# Patient Record
Sex: FEMALE | Race: BLACK OR AFRICAN AMERICAN | NOT HISPANIC OR LATINO | URBAN - NONMETROPOLITAN AREA
[De-identification: names, ages, dates, MRNs, and addresses within clinical notes are randomized per-mention and may not be internally consistent; named-entity substitution may affect disease eponyms.]

---

## 2022-12-07 ENCOUNTER — OFFICE (OUTPATIENT)
Dept: URBAN - NONMETROPOLITAN AREA CLINIC 5 | Facility: CLINIC | Age: 72
End: 2022-12-07

## 2022-12-07 VITALS
HEART RATE: 610 BPM | RESPIRATION RATE: 20 BRPM | DIASTOLIC BLOOD PRESSURE: 87 MMHG | SYSTOLIC BLOOD PRESSURE: 161 MMHG | WEIGHT: 252 LBS | HEIGHT: 62 IN

## 2022-12-07 DIAGNOSIS — K92.1 MELENA: ICD-10-CM

## 2022-12-07 DIAGNOSIS — K64.8 OTHER HEMORRHOIDS: ICD-10-CM

## 2022-12-07 PROCEDURE — 99204 OFFICE O/P NEW MOD 45 MIN: CPT | Performed by: INTERNAL MEDICINE

## 2022-12-07 NOTE — SERVICEHPINOTES
Naz Gama   is a   73 yo  female  with a past medical history of HLD, HTN, and seizure disorder who presents  to [facility] care for hematochezia.  Patient reports that she has been having bright blood per rectum for the last 3 weeks.  She does endorse similar episodes in the past which she was told was secondary to hemorrhoids.  She does endorse a "soreness" in her rectum and feels a bulging in the rectum.  She denies any melena, weight loss, abdominal pain, nausea, vomiting, dysphagia, or odynophagia.  She reports she typically moves her bowels daily.  She denies any hard stools, straining with bowel movements, or nocturnal bowel movements.  She denies smoking, EtOH use, or NSAID use.  She denies any family history of colorectal cancer.  She last had a colonoscopy in August 2020 with Dr. Paz where one 3 mm tubular adenoma was removed.

## 2022-12-07 NOTE — SERVICENOTES
Given patient's hematochezia suspected to be secondary to hemorrhoids I have counseled her on measures as above.  She reports the bleeding is slowly stopping.  If it persists she will let us know and topical steroids can be provided.  Will plan for a repeat colonoscopy given significant bleeding and history of colon polyps.

## 2023-01-03 ENCOUNTER — ON CAMPUS - OUTPATIENT (OUTPATIENT)
Dept: URBAN - NONMETROPOLITAN AREA HOSPITAL 28 | Facility: HOSPITAL | Age: 73
End: 2023-01-03

## 2023-01-03 DIAGNOSIS — K62.5 HEMORRHAGE OF ANUS AND RECTUM: ICD-10-CM

## 2023-01-03 DIAGNOSIS — K64.4 RESIDUAL HEMORRHOIDAL SKIN TAGS: ICD-10-CM

## 2023-01-03 DIAGNOSIS — Z86.010 PERSONAL HISTORY OF COLONIC POLYPS: ICD-10-CM

## 2023-01-03 DIAGNOSIS — D12.3 BENIGN NEOPLASM OF TRANSVERSE COLON: ICD-10-CM

## 2023-01-03 DIAGNOSIS — K64.8 OTHER HEMORRHOIDS: ICD-10-CM

## 2023-01-03 PROCEDURE — 45385 COLONOSCOPY W/LESION REMOVAL: CPT | Performed by: INTERNAL MEDICINE

## 2023-01-03 PROCEDURE — 45380 COLONOSCOPY AND BIOPSY: CPT | Mod: 59 | Performed by: INTERNAL MEDICINE

## 2023-06-19 ENCOUNTER — OFFICE (OUTPATIENT)
Dept: URBAN - NONMETROPOLITAN AREA CLINIC 5 | Facility: CLINIC | Age: 73
End: 2023-06-19

## 2023-06-19 VITALS
WEIGHT: 235 LBS | DIASTOLIC BLOOD PRESSURE: 87 MMHG | HEART RATE: 57 BPM | SYSTOLIC BLOOD PRESSURE: 151 MMHG | HEIGHT: 62 IN | RESPIRATION RATE: 18 BRPM

## 2023-06-19 DIAGNOSIS — K62.5 HEMORRHAGE OF ANUS AND RECTUM: ICD-10-CM

## 2023-06-19 DIAGNOSIS — K64.9 UNSPECIFIED HEMORRHOIDS: ICD-10-CM

## 2023-06-19 PROCEDURE — 99214 OFFICE O/P EST MOD 30 MIN: CPT | Performed by: INTERNAL MEDICINE

## 2023-06-19 NOTE — SERVICEHPINOTES
Naz Gama   is a   73   year old  female   with a past medical history of HLD, HTN, and seizure disorder who presents  tfor follow up of hematochezia.  Patient at initial visit in 12/2022 reported that she had been experiencing bright blood per rectum for the prior 3 weeks.  She did endorse similar episodes in the past which she was told were secondary to hemorrhoids.  She also reported a "soreness" in her rectum and felt a bulging in her rectum.  She denied any melena, weight loss, abdominal pain, nausea, vomiting, dysphagia, or odynophagia.  She reported she typically moved her bowels daily.  She denied any hard stools, straining with bowel movements, or nocturnal bowel movements.  She denied smoking, EtOH use, or NSAID use.  She denied any family history of colorectal cancer.  
br
[facility] were 7 colon polyps removed, small internal hemorrhoids were noted, and large external hemorrhoids thought to be the source of patient's bright red blood per rectum were also documented. Patient was counseled on measures for hemorrhoids with avoiding prolonged sitting/straining on the toilet, placing her feet on a step stool while toileting, soluble fiber use with Metamucil, stool softeners as needed, and topical steroids as needed. Polyps returned as tubular adenomas. Patient will need a repeat colonoscopy in 3 years. She presents for follow-up today.   Patient reports today she has been doing well.  She has had no bleeding with the stools for greater than a month.  She inquired about the findings of colonoscopy and I counseled her that based on the polyps that were found she will need a repeat colonoscopy in 3 years.  Counseled her on attempting measures as above for hemorrhoids.  Patient reports she does not desire to undergo surgery.  She reports otherwise she is doing well and moving the bowels regularly.  She was accompanied by her daughter today.

## 2023-06-19 NOTE — SERVICENOTES
Patient reports she is doing well today.  Counseled again on measures for hemorrhoids as above.  Counseled emptying Benefiber as well.  Will plan to see back in clinic as needed.